# Patient Record
Sex: FEMALE | Race: BLACK OR AFRICAN AMERICAN | NOT HISPANIC OR LATINO
[De-identification: names, ages, dates, MRNs, and addresses within clinical notes are randomized per-mention and may not be internally consistent; named-entity substitution may affect disease eponyms.]

---

## 2017-02-20 ENCOUNTER — RX RENEWAL (OUTPATIENT)
Age: 57
End: 2017-02-20

## 2017-02-22 ENCOUNTER — RX RENEWAL (OUTPATIENT)
Age: 57
End: 2017-02-22

## 2017-05-08 ENCOUNTER — RX RENEWAL (OUTPATIENT)
Age: 57
End: 2017-05-08

## 2021-11-02 ENCOUNTER — TRANSCRIPTION ENCOUNTER (OUTPATIENT)
Age: 61
End: 2021-11-02

## 2021-11-02 ENCOUNTER — APPOINTMENT (OUTPATIENT)
Dept: GASTROENTEROLOGY | Facility: CLINIC | Age: 61
End: 2021-11-02
Payer: COMMERCIAL

## 2021-11-02 VITALS
DIASTOLIC BLOOD PRESSURE: 80 MMHG | TEMPERATURE: 207.5 F | BODY MASS INDEX: 29.15 KG/M2 | SYSTOLIC BLOOD PRESSURE: 120 MMHG | HEIGHT: 63 IN | OXYGEN SATURATION: 99 % | WEIGHT: 164.5 LBS | HEART RATE: 86 BPM

## 2021-11-02 DIAGNOSIS — M06.9 RHEUMATOID ARTHRITIS, UNSPECIFIED: ICD-10-CM

## 2021-11-02 DIAGNOSIS — R74.8 ABNORMAL LEVELS OF OTHER SERUM ENZYMES: ICD-10-CM

## 2021-11-02 DIAGNOSIS — R10.13 EPIGASTRIC PAIN: ICD-10-CM

## 2021-11-02 PROCEDURE — 36415 COLL VENOUS BLD VENIPUNCTURE: CPT

## 2021-11-02 PROCEDURE — 99203 OFFICE O/P NEW LOW 30 MIN: CPT | Mod: 25

## 2021-11-02 NOTE — ASSESSMENT
[FreeTextEntry1] : Impression: No significant LFT abnormalities do not suspect primary liver disease.  Persistently elevated amylase of unknown etiology.  Patient is/has been on numerous medications that could cause pancreatitis but patient patient has no history consistent with acute pancreatitis.  Possible elevation from pulmonary fibrosis.  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  Would want to rule out chronic pancreatitis\par \par Plan: We will send macro amylase and amylase isoenzyme levels as well as IgG4.  Obtain CT scan of abdomen and pelvis with p.o. and IV contrast

## 2021-11-02 NOTE — HISTORY OF PRESENT ILLNESS
[Heartburn] : stable heartburn [Nausea] : denies nausea [Vomiting] : denies vomiting [Diarrhea] : denies diarrhea [Constipation] : denies constipation [Yellow Skin Or Eyes (Jaundice)] : denies jaundice [Abdominal Swelling] : denies abdominal swelling [Rectal Pain] : denies rectal pain [de-identified] : 61-year-old female with a long history of rheumatoid arthritis here for evaluation of persistently elevated amylase level.  Has been on numerous medications in the past.  Methotrexate was discontinued sometime ago due to the development of pulmonary fibrosis.  She is chronically on steroids.  Has been on azathioprine but that was discontinued several months ago.  Currently she is on Rituxan infusions twice a year last 1 a couple of months ago.  She is status post cholecystectomy 10 years ago.  Patient has some dyspeptic symptoms i.e. heartburn and epigastric discomfort off and on but symptoms are very mild and did not warrant any antacids.  Presumably she was screened for hepatitis and tuberculosis prior to starting Rituxan.\par \par Labs from May 2021 show an amylase of 590.  The liver function.  Normal C-reactive protein.  Negative MAR.  Normal lipase.  Labs from August 27 show an amylase of 1077 again with normal lipase.  Slight elevation of ALT at 31.  Remainder of CMP normal.  Labs from October 15 show normal LFTs and an amylase of 874.  Patient states that she had labs done late last week which show amylase to be over thousand again.\par \par Patient has no history of pancreatitis and denies alcohol abuse.

## 2021-11-05 LAB — IGG4 SER-MCNC: 48 MG/DL

## 2021-11-09 LAB
AMYLASE P SERPL-CCNC: 851 U/L
AMYLASE S SERPL-CCNC: 122 U/L
AMYLASE SERPL-CCNC: 973 U/L

## 2021-11-12 LAB — MACROAMYLASE SERPL QL: ABNORMAL

## 2022-12-14 PROBLEM — N64.4 BREAST PAIN: Status: ACTIVE | Noted: 2022-12-12

## 2022-12-19 ENCOUNTER — APPOINTMENT (OUTPATIENT)
Dept: SURGICAL ONCOLOGY | Facility: CLINIC | Age: 62
End: 2022-12-19

## 2022-12-19 DIAGNOSIS — N64.4 MASTODYNIA: ICD-10-CM

## 2023-02-08 PROBLEM — N63.20 BREAST MASS, LEFT: Status: ACTIVE | Noted: 2022-12-14

## 2023-02-15 ENCOUNTER — APPOINTMENT (OUTPATIENT)
Dept: SURGICAL ONCOLOGY | Facility: CLINIC | Age: 63
End: 2023-02-15
Payer: SELF-PAY

## 2023-02-15 VITALS
HEART RATE: 95 BPM | WEIGHT: 170 LBS | HEIGHT: 64 IN | OXYGEN SATURATION: 99 % | RESPIRATION RATE: 16 BRPM | BODY MASS INDEX: 29.02 KG/M2 | DIASTOLIC BLOOD PRESSURE: 94 MMHG | SYSTOLIC BLOOD PRESSURE: 147 MMHG

## 2023-02-15 DIAGNOSIS — N63.20 UNSPECIFIED LUMP IN THE LEFT BREAST, UNSPECIFIED QUADRANT: ICD-10-CM

## 2023-02-15 DIAGNOSIS — Z78.9 OTHER SPECIFIED HEALTH STATUS: ICD-10-CM

## 2023-02-15 PROCEDURE — 99203 OFFICE O/P NEW LOW 30 MIN: CPT

## 2023-02-15 NOTE — CONSULT LETTER
[FreeTextEntry3] : Theresa Norris MD\par Breast Surgeon\par Division of Surgical Oncology\par Department of Surgery\par [address] [place] [address]\[place] [address] \par Tel: [phone]\par Fax: [phone]\par Email: [email or web address]

## 2023-02-15 NOTE — PHYSICAL EXAM
[Normocephalic] : normocephalic [Atraumatic] : atraumatic [EOMI] : extra ocular movement intact [PERRL] : pupils equal, round and reactive to light [Sclera nonicteric] : sclera nonicteric [Supple] : supple [No Supraclavicular Adenopathy] : no supraclavicular adenopathy [No Cervical Adenopathy] : no cervical adenopathy [No Thyromegaly] : no thyromegaly [Examined in the supine and seated position] : examined in the supine and seated position [Bra Size: ___] : Bra Size: [unfilled] [No dominant masses] : no dominant masses in right breast  [No dominant masses] : no dominant masses left breast [No Nipple Retraction] : no left nipple retraction [No Nipple Discharge] : no left nipple discharge [No Axillary Lymphadenopathy] : no left axillary lymphadenopathy [No Edema] : no edema [No Rashes] : no rashes [No Ulceration] : no ulceration [Breast Nipple Inversion] : nipples not inverted [Breast Nipple Retraction] : nipples not retracted [Breast Nipple Flattening] : nipples not flattened [Breast Nipple Fissures] : nipples not fissured [de-identified] : non-labored respirations  [de-identified] : 11:00N13 (pt palp) no obvious mass appreciated--?fat lobule. Well-healed circumareolar incision

## 2023-02-15 NOTE — ASSESSMENT
[FreeTextEntry1] : The patient is a 62 year F referred by Dr. Analia Hodges for consultation regarding L breast mass, BR3.\par \par BIRADS 3 findings are considered have a less than or equal to 2% likelihood of malignancy. This category reduces the number of false-positive biopsies and justifies a period of watchful waiting, avoiding unnecessary workup when the likelihood of malignancy is very low. \par \par Ultrasound BI-RADS 3 masses will typically undergo a 6-, 12-, and 24-month surveillance protocol to ensure stability and continued benign appearance. After 24 months of stability, the patient may return to routine screening. If during this surveillance period, the mass decreases in size or demonstrates resolution, it can be downgraded to a BI-RADS 2 (benign). If during the surveillance period the mass grows in size or demonstrates suspicious qualities, then the BI-RADS category may be upgraded to a biopsy recommendation. \par \par We discussed breast pain, which is one of the most common breast complaints seen in the primary care setting. We discussed that spontaneous resolution of breast pain is common. The patient was reassured that breast pain is not a risk factor for breast cancer. NSAIDs are appropriate to help with pain. We also discussed evening primrose oil, which has not been shown to have a definitive effect on pain, but is over-the-counter, and may help. A well-fitting bra or sports bra may also help with compression and support of the bra and help reduce any breast discomfort.\par \par Caffeine intake, iodine deficiency, and dietary fat intake have not been definitively established as causal factors in breast pain, and dietary modifications are not effective treatments. However, these are low-risk options that may have some benefit.\par \par \par Plan:\par - L  4/2023 \par - RTO after imaging

## 2023-02-15 NOTE — HISTORY OF PRESENT ILLNESS
[FreeTextEntry1] : The patient is a 62 year F referred by Dr. Analia Hodges for consultation regarding L breast mass and breast pain, here for initial visit. \par \par The patient reports that she had a left breast surgical excisional biopsy done 30 years ago.  She denies any subsequent abnormal breast imaging, nor any additional breast biopsies.  She started to notice severe bilateral breast pain starting in September 2022.  Of note she has severe rheumatoid arthritis as well as interstitial pneumonitis for which she undergoes Rituxan and prednisone treatments.  The patient assumed that the breast pains were related to her arthritis and or treatments.  Pain is worse in the left breast than the right breast, but is severe and constant.  Currently the pain is not present.  She takes ibuprofen for pain, but notes very little relief.  She does not note any correlation between pain intensity and activity.  She denies wearing any sports bras.  Does not consume coffee.  Starting in December/2022, she noticed a left breast lump in her upper inner breast.  She notes that it is about pea-sized, and has not increased in size.  Denies any skin changes or nipple discharge.\par \par Most recent imaging:\par 10/12/2021 B/L SM ([facility]) revealed SFGD breasts\par - B/L neg\par - BR1\par \par 10/12/2021 B/L US rheumatoid arthritis, interstitial pneumonitis\par - R neg\par - L 2:00 N6 0.4 x 0.2 x 0.4 cm benign appearing hypoechoic nodule\par - BR3\par \par 4/14/2022 L US ([facility])\par - L 2:00 N4 4mm morphologically benign-appearing intramammary LN \par - L 2:00 N6 5 mm ovoid benign-appearing hypoechoic nodule not significantly changed\par - BR3, f/u B/L SM and US in 6 months\par \par 10/17/2022 B/L SM ([facility]) revealed fatty breasts\par - B/L neg\par - BR1\par \par 10/17/2022 B/L US\par - L 2:00 N4 4 x 2 x 4 mm stable intramammary LN \par - L 2:00 N6 4 x 2 x 4 mm hypoechoic nodule, stable since 10/12/2021-> L US in 6 months\par - BR3\par \par PMH: Rheumatoid arthritis, interstitial pneumonitis\par PSH: Cholecystectomy 2008, left breast surgical excisional biopsy 30 years ago\par Meds: Rituxan, prednisone, Maurisio\par ALL: NKDA\par SH: thoracoscopy w/ wedge resection of lung, cholecystectomy, L breast biopsy\par FH: colon cancer, aunt 80.  No breast cancer.\par GYN: Menarche 13.  Menopause 40.  G2, P1.  1 miscarriage.  Age of first pregnancy 18.  Breast-feeding 9 months.  OCP none.  Fertility none.  HRT 1 year.\par

## 2023-02-15 NOTE — PAST MEDICAL HISTORY
[Postmenopausal] : The patient is postmenopausal [Menarche Age ____] : age at menarche was [unfilled] [Menopause Age____] : age at menopause was [unfilled] [History of Hormone Replacement Treatment] : has a history of hormone replacement treatment [Total Preg ___] : G[unfilled] [Live Births ___] : P[unfilled]  [de-identified] : 1 year HRT [FreeTextEntry6] : None [FreeTextEntry7] : None [FreeTextEntry8] : 9 months